# Patient Record
Sex: MALE | Race: BLACK OR AFRICAN AMERICAN | NOT HISPANIC OR LATINO | URBAN - METROPOLITAN AREA
[De-identification: names, ages, dates, MRNs, and addresses within clinical notes are randomized per-mention and may not be internally consistent; named-entity substitution may affect disease eponyms.]

---

## 2020-08-18 ENCOUNTER — IMPORTED ENCOUNTER (OUTPATIENT)
Dept: URBAN - METROPOLITAN AREA CLINIC 1 | Facility: CLINIC | Age: 35
End: 2020-08-18

## 2020-08-18 PROBLEM — H02.844: Noted: 2020-08-18

## 2020-08-18 PROBLEM — H02.841: Noted: 2020-08-18

## 2020-08-18 PROBLEM — H25.813: Noted: 2020-08-18

## 2020-08-18 PROBLEM — H40.013: Noted: 2020-08-18

## 2020-08-18 PROBLEM — E11.9: Noted: 2020-08-18

## 2020-08-18 PROBLEM — Z79.4: Noted: 2020-08-18

## 2020-08-18 PROCEDURE — 92015 DETERMINE REFRACTIVE STATE: CPT

## 2020-08-18 PROCEDURE — 92004 COMPRE OPH EXAM NEW PT 1/>: CPT

## 2020-08-18 PROCEDURE — 92250 FUNDUS PHOTOGRAPHY W/I&R: CPT

## 2020-08-18 NOTE — PATIENT DISCUSSION
1.  DM Type II (Oral Medication) without sign of diabetic retinopathy and no blot heme on dilated retinal examination today OU No Macular Edema:  Discussed the pathophysiology of diabetes and its effect on the eye and risk of blindness. Stressed the importance of strong glucose control. Advised of importance of at least yearly dilated examinations but to contact us immediately for any problems or concerns. 2.  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 Cataract OU: Observe for now without intervention. The patient was advised to contact us if any change or worsening of vision4. Glaucoma Suspect OU (CD 0.75 OU): Baseline photo done today showing disc cupping OU. Patient is considered Low Risk. Condition was discussed with patient and patient understands. Will continue to monitor patient for any progression in condition. Patient was advised to call us with any problems questions or concerns. MRX for glasses given. Return for an appointment in 6 months 10/OCT with Dr. Darline Adams.

## 2021-06-18 NOTE — PATIENT DISCUSSION
Informed patient that their cataract is visually significant and meets the criteria for cataract surgery to increase their vision and decrease their glare symptoms. RBALs of surgery discussed, questions answered patient to schedule surgery with a surgical coordinator. Will hold glasses Rx. for cataract surgery. +Try Pan Blue OU.

## 2021-07-23 NOTE — PATIENT DISCUSSION
LENS OPTION (STANDARD): Discussed with patient in detail all available methods and lens options as well as their associated benefits, limitations and out-of-pocket costs. Patient chooses standard cataract surgery with monofocal lens implant and understands that reading glasses will still be needed after surgery. The patient also understands that with any IOL there is no guarantee that they will not require glasses to see their best at any distance after surgery. The risks, benefits and alternatives to surgery were explained and all questions were answered.

## 2021-07-23 NOTE — PATIENT DISCUSSION
CATARACT SURGERY PLANNER - STANDARD IOL/NO FEMTO: Phacoemulsification with IOL: Eye: OS|DOS: 7/29/21|Model: AABOO|Power: 20. 0|Target: PLANO|Visc: NUVISC|Omidria: YES|10% Phenylephrine: YES|Epi-shugarcaine: YES|Phaco Setting: DENSE|BSS+: NO|Trypan Blue: YES|CTR: NO|Olive Tip: NO|Atropine: NO|Pupilloplasty: NO|Notes: PLAN: AABOO @ PLANO OU; HX MILD AV CROSSING CHANGES OS, *IDDM*. DILATED PUPIL: 7.5MM.

## 2021-10-21 ENCOUNTER — IMPORTED ENCOUNTER (OUTPATIENT)
Dept: URBAN - METROPOLITAN AREA CLINIC 1 | Facility: CLINIC | Age: 36
End: 2021-10-21

## 2021-10-21 PROBLEM — E11.9: Noted: 2021-10-21

## 2021-10-21 PROBLEM — H16.143: Noted: 2021-10-21

## 2021-10-21 PROBLEM — H04.123: Noted: 2021-10-21

## 2021-10-21 PROBLEM — H40.013: Noted: 2021-10-21

## 2021-10-21 PROBLEM — Z79.84: Noted: 2021-10-21

## 2021-10-21 PROBLEM — H25.813: Noted: 2021-10-21

## 2021-10-21 PROCEDURE — 92014 COMPRE OPH EXAM EST PT 1/>: CPT

## 2021-10-21 PROCEDURE — 92133 CPTRZD OPH DX IMG PST SGM ON: CPT

## 2021-10-21 NOTE — PATIENT DISCUSSION
1.  DM Type II (oral meds) without sign of diabetic retinopathy and no blot heme on dilated retinal examination today OU No Macular Edema:  Discussed the pathophysiology of diabetes and its effect on the eye and risk of blindness. Stressed the importance of strong glucose control. Advised of importance of at least yearly dilated examinations but to contact us immediately for any problems or concerns. 2. 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 IOP 13 OU. OCT shows moderate thinning OU. () AA (-) FHX Will treat w/ any future progression. Patient to continue with current gtt regimen. Patient advised to be compliant with gtts. Condition was discussed with patient and patient understands. Will continue to monitor patient for any progression in condition. Patient was advised to call us with any problems questions or concerns. 3.  Cataract OU - Observe for now without intervention. The patient was advised to contact us if any change or worsening of vision. 4. CJ w/ PEK OU - Recommend ATs TID OU routinely. Letter sent to PCP. Return for an appointment in 6 months for 10/HVF/repeat OCT with Dr. Amie Ramos.

## 2022-04-02 ASSESSMENT — VISUAL ACUITY
OD_CC: 20/25-1
OS_CC: 20/25-1
OS_CC: 20/20-1
OD_CC: 20/20

## 2022-04-02 ASSESSMENT — TONOMETRY
OS_IOP_MMHG: 13
OD_IOP_MMHG: 13
OD_IOP_MMHG: 13
OS_IOP_MMHG: 13

## 2022-04-22 ENCOUNTER — FOLLOW UP (OUTPATIENT)
Dept: URBAN - METROPOLITAN AREA CLINIC 1 | Facility: CLINIC | Age: 37
End: 2022-04-22

## 2022-04-22 DIAGNOSIS — H16.143: ICD-10-CM

## 2022-04-22 DIAGNOSIS — H25.813: ICD-10-CM

## 2022-04-22 DIAGNOSIS — H40.023: ICD-10-CM

## 2022-04-22 DIAGNOSIS — H04.123: ICD-10-CM

## 2022-04-22 PROCEDURE — 92014 COMPRE OPH EXAM EST PT 1/>: CPT

## 2022-04-22 PROCEDURE — 92133 CPTRZD OPH DX IMG PST SGM ON: CPT

## 2022-04-22 PROCEDURE — 92083 EXTENDED VISUAL FIELD XM: CPT

## 2022-04-22 ASSESSMENT — TONOMETRY
OD_IOP_MMHG: 12
OS_IOP_MMHG: 12

## 2022-04-22 ASSESSMENT — VISUAL ACUITY
OS_SC: 20/20
OD_SC: 20/20

## 2022-04-22 NOTE — PATIENT DISCUSSION
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 IOP 12 OU. OCT w/o progression OU. HVF WNL OD and early defect OS vs. artifact. (+) AA, (-) FHX. Will treat w/ any future progression. Condition was discussed with patient and patient understands. Will continue to monitor patient for any progression in condition. Patient was advised to call us with any problems questions or concerns.

## 2023-04-25 ENCOUNTER — COMPREHENSIVE EXAM (OUTPATIENT)
Dept: URBAN - METROPOLITAN AREA CLINIC 1 | Facility: CLINIC | Age: 38
End: 2023-04-25

## 2023-04-25 DIAGNOSIS — H25.813: ICD-10-CM

## 2023-04-25 DIAGNOSIS — H40.023: ICD-10-CM

## 2023-04-25 DIAGNOSIS — H16.143: ICD-10-CM

## 2023-04-25 DIAGNOSIS — H04.123: ICD-10-CM

## 2023-04-25 DIAGNOSIS — E11.9: ICD-10-CM

## 2023-04-25 PROCEDURE — 92014 COMPRE OPH EXAM EST PT 1/>: CPT

## 2023-04-25 ASSESSMENT — VISUAL ACUITY
OD_SC: J1
OD_SC: 20/25-1
OS_PH: 20/25
OS_SC: J1
OS_SC: 20/30-1

## 2023-04-25 ASSESSMENT — TONOMETRY
OS_IOP_MMHG: 15
OD_IOP_MMHG: 16

## 2023-11-14 ENCOUNTER — FOLLOW UP (OUTPATIENT)
Dept: URBAN - METROPOLITAN AREA CLINIC 1 | Facility: CLINIC | Age: 38
End: 2023-11-14

## 2023-11-14 DIAGNOSIS — H04.123: ICD-10-CM

## 2023-11-14 DIAGNOSIS — H16.143: ICD-10-CM

## 2023-11-14 PROCEDURE — 92012 INTRM OPH EXAM EST PATIENT: CPT

## 2023-11-14 ASSESSMENT — VISUAL ACUITY
OS_SC: J1+
OS_SC: 20/20-1
OD_SC: J1+
OD_SC: 20/25-2

## 2023-11-14 ASSESSMENT — TONOMETRY
OS_IOP_MMHG: 14
OD_IOP_MMHG: 17

## 2024-04-16 ENCOUNTER — COMPREHENSIVE EXAM (OUTPATIENT)
Dept: URBAN - METROPOLITAN AREA CLINIC 1 | Facility: CLINIC | Age: 39
End: 2024-04-16

## 2024-04-16 DIAGNOSIS — H25.813: ICD-10-CM

## 2024-04-16 DIAGNOSIS — H40.023: ICD-10-CM

## 2024-04-16 DIAGNOSIS — H16.143: ICD-10-CM

## 2024-04-16 DIAGNOSIS — H04.123: ICD-10-CM

## 2024-04-16 DIAGNOSIS — E11.9: ICD-10-CM

## 2024-04-16 PROCEDURE — 92014 COMPRE OPH EXAM EST PT 1/>: CPT

## 2024-04-16 ASSESSMENT — TONOMETRY
OD_IOP_MMHG: 14
OS_IOP_MMHG: 14

## 2024-04-16 ASSESSMENT — VISUAL ACUITY
OD_SC: 20/25
OS_SC: 20/20-1